# Patient Record
Sex: MALE | Race: OTHER | URBAN - METROPOLITAN AREA
[De-identification: names, ages, dates, MRNs, and addresses within clinical notes are randomized per-mention and may not be internally consistent; named-entity substitution may affect disease eponyms.]

---

## 2022-06-09 ENCOUNTER — OFFICE VISIT (OUTPATIENT)
Dept: FAMILY MEDICINE CLINIC | Age: 32
End: 2022-06-09

## 2022-06-09 VITALS
WEIGHT: 154 LBS | OXYGEN SATURATION: 97 % | HEART RATE: 67 BPM | HEIGHT: 60 IN | BODY MASS INDEX: 30.23 KG/M2 | DIASTOLIC BLOOD PRESSURE: 75 MMHG | TEMPERATURE: 98.2 F | SYSTOLIC BLOOD PRESSURE: 128 MMHG

## 2022-06-09 DIAGNOSIS — Z00.00 ENCOUNTER FOR MEDICAL EXAMINATION TO ESTABLISH CARE: ICD-10-CM

## 2022-06-09 DIAGNOSIS — H60.311 ACUTE DIFFUSE OTITIS EXTERNA OF RIGHT EAR: Primary | ICD-10-CM

## 2022-06-09 LAB
GLUCOSE POC: NORMAL MG/DL
HGB BLD-MCNC: 14.1 G/DL

## 2022-06-09 PROCEDURE — 99202 OFFICE O/P NEW SF 15 MIN: CPT | Performed by: FAMILY MEDICINE

## 2022-06-09 PROCEDURE — 85018 HEMOGLOBIN: CPT | Performed by: FAMILY MEDICINE

## 2022-06-09 PROCEDURE — 82962 GLUCOSE BLOOD TEST: CPT | Performed by: FAMILY MEDICINE

## 2022-06-09 RX ORDER — AMOXICILLIN AND CLAVULANATE POTASSIUM 875; 125 MG/1; MG/1
1 TABLET, FILM COATED ORAL EVERY 12 HOURS
Qty: 20 TABLET | Refills: 0 | Status: SHIPPED | OUTPATIENT
Start: 2022-06-09 | End: 2022-06-19

## 2022-06-09 RX ORDER — NEOMYCIN SULFATE, POLYMYXIN B SULFATE, HYDROCORTISONE 3.5; 10000; 1 MG/ML; [USP'U]/ML; MG/ML
3-4 SOLUTION/ DROPS AURICULAR (OTIC) 4 TIMES DAILY
Qty: 10 ML | Refills: 0 | Status: SHIPPED | OUTPATIENT
Start: 2022-06-09

## 2022-06-09 NOTE — PROGRESS NOTES
Steffanie Rose (: 1990) is a 27 y.o. male, new patient, here for evaluation of the following chief complaint(s):  Ear Pain (Ear pain with feeling of fullness.) and Establish Care       ASSESSMENT/PLAN:  1. Acute diffuse otitis externa of right ear  Tx with Cortisporin and Augmentin. 2. Encounter for medical examination to establish care  -     AMB POC GLUCOSE BLOOD, BY GLUCOSE MONITORING DEVICE  -     AMB POC HEMOGLOBIN (HGB)      SUBJECTIVE:  HPI  Rt ear ache:  X 2 weeks. Worsened after being at the beach 1 week ago. Ear will swell shut and he can't hear. Having recurrent swelling of Rt side of head. FHx: No DM, HTN  PMHx:  NO problems. Review of Systems   Constitutional: Negative for fever. HENT: Negative for congestion, dental problem and sore throat. Respiratory: Negative for cough. OBJECTIVE:  Blood pressure 128/75, pulse 67, temperature 98.2 °F (36.8 °C), temperature source Temporal, height 4' 10.27\" (1.48 m), weight 154 lb (69.9 kg), SpO2 97 %. Physical Exam  CONSTITUTIONAL:  Well developed. No apparent distress. HEENT:  Sclera clear. Rt external canal is swollen and tender with some periauricular tenderness. Throat without PND or erythema. Face muscles symmetric. NECK:  Normal inspection, normal palpation without any lymphadenopathy, masses, or thyromegaly. CARDIOVASCULAR:  Regular rate and rhythm. Normal S1, S2. No extra sounds. RESPIRATORY:  Normal effort. Normal ascultation without wheezing. EXTREMITIES:  No edema. Results for orders placed or performed in visit on 22   AMB POC GLUCOSE BLOOD, BY GLUCOSE MONITORING DEVICE   Result Value Ref Range    Glucose -nf MG/DL   AMB POC HEMOGLOBIN (HGB)   Result Value Ref Range    Hemoglobin (POC) 14.1 G/DL         An electronic signature was used to authenticate this note.   -- Jeana Barragan MD

## 2022-06-09 NOTE — PROGRESS NOTES
I have printed AVS and reviewed it with patient today. Patient verbalized understanding. I reviewed with patient medications sent to pharmacy and how the medication is taken. Patient verbalized understanding. The patient was given coupons for prescriptions and I explained how the coupons are used. Patient verbalized understanding. Patient correctly stated his full name and date of birth prior to the information shared.  [id number] with the [facility] assisted with this discharge.   Rebekah Mantilla RN